# Patient Record
Sex: FEMALE | Race: BLACK OR AFRICAN AMERICAN | Employment: FULL TIME | URBAN - METROPOLITAN AREA
[De-identification: names, ages, dates, MRNs, and addresses within clinical notes are randomized per-mention and may not be internally consistent; named-entity substitution may affect disease eponyms.]

---

## 2021-07-18 ENCOUNTER — HOSPITAL ENCOUNTER (EMERGENCY)
Age: 21
Discharge: HOME | End: 2021-07-18
Payer: COMMERCIAL

## 2021-07-18 VITALS
WEIGHT: 146 LBS | RESPIRATION RATE: 18 BRPM | HEART RATE: 85 BPM | TEMPERATURE: 98.3 F | OXYGEN SATURATION: 100 % | SYSTOLIC BLOOD PRESSURE: 130 MMHG | BODY MASS INDEX: 22.13 KG/M2 | DIASTOLIC BLOOD PRESSURE: 85 MMHG | HEIGHT: 68 IN

## 2021-07-18 DIAGNOSIS — J02.9 ACUTE PHARYNGITIS, UNSPECIFIED ETIOLOGY: Primary | ICD-10-CM

## 2021-07-18 DIAGNOSIS — R05.9 COUGH: ICD-10-CM

## 2021-07-18 LAB
SARS-COV-2: NOT DETECTED
SOURCE: NORMAL

## 2021-07-18 PROCEDURE — 6360000002 HC RX W HCPCS: Performed by: PHYSICIAN ASSISTANT

## 2021-07-18 PROCEDURE — U0003 INFECTIOUS AGENT DETECTION BY NUCLEIC ACID (DNA OR RNA); SEVERE ACUTE RESPIRATORY SYNDROME CORONAVIRUS 2 (SARS-COV-2) (CORONAVIRUS DISEASE [COVID-19]), AMPLIFIED PROBE TECHNIQUE, MAKING USE OF HIGH THROUGHPUT TECHNOLOGIES AS DESCRIBED BY CMS-2020-01-R: HCPCS

## 2021-07-18 PROCEDURE — 99284 EMERGENCY DEPT VISIT MOD MDM: CPT

## 2021-07-18 PROCEDURE — U0005 INFEC AGEN DETEC AMPLI PROBE: HCPCS

## 2021-07-18 RX ORDER — DEXAMETHASONE 4 MG/1
10 TABLET ORAL ONCE
Status: COMPLETED | OUTPATIENT
Start: 2021-07-18 | End: 2021-07-18

## 2021-07-18 RX ADMIN — DEXAMETHASONE 10 MG: 4 TABLET ORAL at 06:48

## 2021-07-18 NOTE — ED PROVIDER NOTES
eMERGENCY dEPARTMENT eNCOUnter        [address]    Chief Complaint   Patient presents with    Pharyngitis       HPI    Twila Del Rio is a 24 y.o. female who presents with ST. Onset was 2 days ago. Associated with nasal congestion and dry cough. No shortness of breath or chest pain. No fevers. Would like testing for Covid. Has no history of diabetes or asthma. Girlfriend sick with similar illness. Did not receive Covid vaccine. REVIEW OF SYSTEMS    See HPI for further details. Review of systems otherwise negative. Constitutional:  no fever. HENT:  no headache, no earache, + nasal congestion, no sinus pressure, + sore throat  Respiratory:  + cough, no sob. Cardiovascular:  Denies chest pain. GI:  Denies nausea, vomiting, diarrhea. Musculoskeletal:  Denies edema, tenderness. Integument:  Denies rashes. PAST MEDICAL HISTORY    History reviewed. No pertinent past medical history. SURGICAL HISTORY    History reviewed. No pertinent surgical history. CURRENT MEDICATIONS        ALLERGIES    Allergies   Allergen Reactions    Amoxicillin Hives       FAMILY HISTORY    History reviewed. No pertinent family history.     SOCIAL HISTORY    Social History     Socioeconomic History    Marital status: Single     Spouse name: None    Number of children: None    Years of education: None    Highest education level: None   Occupational History    None   Tobacco Use    Smoking status: Never Smoker    Smokeless tobacco: Never Used   Vaping Use    Vaping Use: Never used   Substance and Sexual Activity    Alcohol use: Yes     Comment: occasional    Drug use: Yes     Frequency: 1.0 times per week     Types: Marijuana    Sexual activity: Yes     Partners: Female   Other Topics Concern    None   Social History Narrative    None     Social Determinants of Health     Financial Resource Strain:     Difficulty of Paying Living Expenses:    Food Insecurity:     Worried About Running Out of Food in the Last Year:    [address] in the Last Year:    Transportation Needs:     Lack of Transportation (Medical):  Lack of Transportation (Non-Medical):    Physical Activity:     Days of Exercise per Week:     Minutes of Exercise per Session:    Stress:     Feeling of Stress :    Social Connections:     Frequency of Communication with Friends and Family:     Frequency of Social Gatherings with Friends and Family:     Attends Yazidism Services:     Active Member of Clubs or Organizations:     Attends Club or Organization Meetings:     Marital Status:    Intimate Partner Violence:     Fear of Current or Ex-Partner:     Emotionally Abused:     Physically Abused:     Sexually Abused:        PHYSICAL EXAM    VITAL SIGNS: /86   Pulse 89   Temp 98.3 °F (36.8 °C) (Oral)   Resp 18   Ht 5' 8\" (1.727 m)   Wt 146 lb (66.2 kg)   SpO2 100%   BMI 22.20 kg/m²   GENERAL:  Well-developed, well-nourished, no acute distress  EYES:   PERRL, EOMI. Conjunctiva normal.  HENT:  NC/AT. External ears normal.  Nares patent. No sinus tenderness to palpation/percussion. Oropharynx moist and pink. No posterior pharyngeal erythema. no tonsillar edema, no exudates. Uvula midline. LUNGS:  Lungs CTAB, no rales or stridor or wheezing. CARDIAC:   RRR. No murmurs. ABDOMEN:  Abdomen soft, non-tender. Bowel sounds active. EXTREMITIES:   No edema. DP intact and symmetric. SKIN:   Warm and dry. NEURO:  Alert and oriented. No focal deficits. LYMPH:  No cervical lymphadenopathy. RADIOLOGY/PROCEDURES        ED COURSE & MEDICAL DECISION MAKING    Pertinent Labs & Imaging studies reviewed. (See chart for details)  -  Patient seen and evaluated in the emergency department. -  Triage and nursing notes reviewed and incorporated. -  Old chart records reviewed and incorporated.   -  I evaluated this patient independently  -  Differential diagnosis includes: upper respiratory infection, sinusitis, influenza, pneumonia, mononucleosis, and others  -  Work-up included: COVID  -  Patient discharged home. Likely viral illness, could be Covid, testing sent. Recommended quarantine until results are obtained. Discussed over-the-counter medications, rest, increase fluids. Patient to follow-up with PCP in 1 week for recheck if symptoms persist.  Return to the Emergency Department for new/worsening symptoms as needed. Patient agreeable with plan of care and disposition    FINAL IMPRESSION    1. Acute pharyngitis, unspecified etiology    2.  Cough             [address] [facility]  07/18/21 [id number] [facility]  07/18/21 [id number]